# Patient Record
Sex: MALE | Race: BLACK OR AFRICAN AMERICAN | NOT HISPANIC OR LATINO | URBAN - METROPOLITAN AREA
[De-identification: names, ages, dates, MRNs, and addresses within clinical notes are randomized per-mention and may not be internally consistent; named-entity substitution may affect disease eponyms.]

---

## 2017-09-28 ENCOUNTER — EMERGENCY (EMERGENCY)
Facility: HOSPITAL | Age: 82
LOS: 1 days | Discharge: HOME | End: 2017-09-28
Attending: EMERGENCY MEDICINE | Admitting: EMERGENCY MEDICINE
Payer: MEDICARE

## 2017-09-28 VITALS
HEART RATE: 90 BPM | OXYGEN SATURATION: 100 % | DIASTOLIC BLOOD PRESSURE: 82 MMHG | SYSTOLIC BLOOD PRESSURE: 156 MMHG | RESPIRATION RATE: 16 BRPM

## 2017-09-28 PROCEDURE — 99284 EMERGENCY DEPT VISIT MOD MDM: CPT | Mod: 25

## 2017-09-28 PROCEDURE — 12001 RPR S/N/AX/GEN/TRNK 2.5CM/<: CPT

## 2017-09-28 NOTE — ED ADULT TRIAGE NOTE - CHIEF COMPLAINT QUOTE
Pt arrives to ED s/p cutting left pointer finger while cooking.  Pt takes ASA.  Bleeding controlled at this time

## 2017-09-29 RX ORDER — TETANUS TOXOID, REDUCED DIPHTHERIA TOXOID AND ACELLULAR PERTUSSIS VACCINE, ADSORBED 5; 2.5; 8; 8; 2.5 [IU]/.5ML; [IU]/.5ML; UG/.5ML; UG/.5ML; UG/.5ML
0.5 SUSPENSION INTRAMUSCULAR ONCE
Qty: 0 | Refills: 0 | Status: COMPLETED | OUTPATIENT
Start: 2017-09-29 | End: 2017-09-29

## 2017-09-29 RX ADMIN — TETANUS TOXOID, REDUCED DIPHTHERIA TOXOID AND ACELLULAR PERTUSSIS VACCINE, ADSORBED 0.5 MILLILITER(S): 5; 2.5; 8; 8; 2.5 SUSPENSION INTRAMUSCULAR at 00:42

## 2017-09-29 NOTE — ED PROVIDER NOTE - CARE PLAN
Principal Discharge DX:	Laceration of index finger without foreign body without damage to nail, unspecified laterality, initial encounter

## 2017-09-29 NOTE — ED PROVIDER NOTE - PRINCIPAL DIAGNOSIS
Laceration of index finger without foreign body without damage to nail, unspecified laterality, initial encounter

## 2017-09-29 NOTE — ED PROVIDER NOTE - OBJECTIVE STATEMENT
80 y/o M with h/o HTN, hyperlipidemia, DM here with laceration to left index finger.  Pt reports he was cutting food with a knife and accidentally cut himself.  No other injuries.  No active bleeding at this time.  Unknown last tdap.

## 2017-09-29 NOTE — ED PROVIDER NOTE - PHYSICAL EXAMINATION
1cm linear laceration to L 2nd digit on palmar aspect, no joint involvement.  Normal ROM at DIP/PIP/MCP.  Neurovascularly intact.